# Patient Record
Sex: MALE | Race: OTHER | NOT HISPANIC OR LATINO | URBAN - METROPOLITAN AREA
[De-identification: names, ages, dates, MRNs, and addresses within clinical notes are randomized per-mention and may not be internally consistent; named-entity substitution may affect disease eponyms.]

---

## 2020-09-10 ENCOUNTER — EMERGENCY (EMERGENCY)
Facility: HOSPITAL | Age: 25
LOS: 1 days | Discharge: HOME | End: 2020-09-10
Attending: EMERGENCY MEDICINE
Payer: MEDICAID

## 2020-09-10 VITALS
TEMPERATURE: 98 F | DIASTOLIC BLOOD PRESSURE: 66 MMHG | OXYGEN SATURATION: 99 % | RESPIRATION RATE: 16 BRPM | WEIGHT: 149.91 LBS | HEART RATE: 97 BPM | HEIGHT: 71 IN | SYSTOLIC BLOOD PRESSURE: 123 MMHG

## 2020-09-10 PROCEDURE — 99283 EMERGENCY DEPT VISIT LOW MDM: CPT | Mod: 25

## 2020-09-10 PROCEDURE — 73562 X-RAY EXAM OF KNEE 3: CPT | Mod: 26,LT

## 2020-09-10 PROCEDURE — 99283 EMERGENCY DEPT VISIT LOW MDM: CPT

## 2020-09-10 PROCEDURE — 73562 X-RAY EXAM OF KNEE 3: CPT

## 2020-09-10 RX ORDER — IBUPROFEN 200 MG
600 TABLET ORAL ONCE
Refills: 0 | Status: COMPLETED | OUTPATIENT
Start: 2020-09-10 | End: 2020-09-10

## 2020-09-10 RX ORDER — DICLOFENAC SODIUM 30 MG/G
1 GEL TOPICAL
Qty: 60 | Refills: 0
Start: 2020-09-10 | End: 2020-10-09

## 2020-09-10 RX ORDER — IBUPROFEN 200 MG
1 TABLET ORAL
Qty: 40 | Refills: 0
Start: 2020-09-10 | End: 2020-09-19

## 2020-09-10 RX ADMIN — Medication 600 MILLIGRAM(S): at 15:38

## 2020-09-10 NOTE — ED PROVIDER NOTE - PATIENT PORTAL LINK FT
You can access the FollowMyHealth Patient Portal offered by [facility] by registering at the following website: [email or web address]. By joining [employer]’s FollowMyHealth portal, you will also be able to view your health information using other applications (apps) compatible with our system.

## 2020-09-10 NOTE — ED PROVIDER NOTE - CHPI ED SYMPTOMS NEG
no tingling/no weakness/no abrasion/no back pain/no deformity/no fever/no numbness/no bruising/no difficulty bearing weight/no stiffness
